# Patient Record
(demographics unavailable — no encounter records)

---

## 2018-02-11 NOTE — ER REPORT
History and Physical


Time Seen By MD:  10:04


HPI/ROS


CHIEF COMPLAINT: Right pinky pain





HISTORY OF PRESENT ILLNESS: 13 year-old male sustained injury while playing 

basketball yesterday a ball that was passed with moderate force, his pinky and 

bent it out to the side causing bruising and swelling. This occurred around 4 

PM yesterday. It was bandaged up overnight but did not Gro[place] this morning. 

He is unable to fully straighten the pinky or make a fist due to pain and 

swelling. Concern for fracture. No other concerns or complaints today.





REVIEW OF SYSTEMS:


Respiratory: No cough, no dyspnea.


Cardiovascular: No chest pain, no palpitations.


Gastrointestinal: No vomiting, no abdominal pain.


Musculoskeletal: No back pain.


Allergies:  


Coded Allergies:  


     No Known Drug Allergies (Unverified , 2/11/18)


Constitutional





Vital Sign - Last 24 Hours








 2/11/18





 10:03


 


Temp 98.9


 


Pulse 93


 


Resp 20


 


B/P (MAP) 114/59


 


Pulse Ox 92


 


O2 Delivery Room Air








Physical Exam


  General Appearance: The patient is alert, has no immediate need for airway 

protection and no current signs of toxicity. No acute distress


Eyes: Pupils equal and round no injection.


Respiratory: Chest is non tender, lungs are clear to auscultation.


Cardiac: regular rate and rhythm [ ]


Gastrointestinal: Abdomen is soft and non tender, no masses, bowel sounds 

normal.


Musculoskeletal:  Neck: Neck is supple and non tender.


  Right pinky deviates laterally moderate swelling and discoloration ecchymoses 

no signs of cellulitis or infection: Slightly bent position. Otherwise all 

Extremities have full range of motion and are non tender.


Skin: No rashes or lesions.


Intact no lac or abrasion


DIFFERENTIAL DIAGNOSIS: After history and physical exam differential diagnosis 

was considered for pinky fracture, sprain, dislocation no signs of infection or 

abscess





Medical Decision Making


EKG/Imaging


Imaging


X-ray positive for fracture right 5th pinky finger: Acute Merkley oriented 

proximal phalanx 2 mm medial displacement approximately does not extend into 

growth plate





ED Course/Re-evaluation


ED Course


Plan of care agreed upon prior to orders placed. X-rays right pinky to assess 

for fracture dislocation other abnormality. Had ibuprofen prior to arrival and 

does not need pain medication at this time 02/11/2018 10:17:11 am


Procedure


Finger splint applied by ED tech: I evaluated splint for adequacy and comfort 

patient seen to be neurovascularly intact


Decision to Disposition Date:  Feb 11, 2018


Decision to Disposition Time:  11:19





Depart


Departure


Latest Vital Signs





Vital Signs








  Date Time  Temp Pulse Resp B/P (MAP) Pulse Ox O2 Delivery O2 Flow Rate FiO2


 


2/11/18 10:03 98.9 93 20 114/59 92 Room Air  








Impression:  


 Primary Impression:  


 Closed fracture of distal phalanx of right little finger


Condition:  Improved


Disposition:  HOME OR SELF-CARE


Referrals:  


MARY TINOCO MD


2 Days


New Scripts


Acetaminophen With Codeine # 3 (TYLENOL WITH CODEINE #3 TABLET) 1 Each Tablet


1 EACH PO QHS Y for SEVERE PAIN for 7 Days, #10 TAB


   Prov: DARCIE MEDLEY MD         2/11/18





Problem Qualifiers








 Primary Impression:  


 Closed fracture of distal phalanx of right little finger


 Encounter type:  initial encounter  Fracture alignment:  displaced  Qualified 

Codes:  S62.636A - Displaced fracture of distal phalanx of right little finger, 

initial encounter for closed fracture








DARCIE MEDLEY MD Feb 11, 2018 10:04

## 2018-02-11 NOTE — RADIOLOGY IMAGING REPORT
FACILITY: [facility] [place] 

 

PATIENT NAME: Kei Kamara

: 2004

MR: [id number]

V: [id number]

EXAM DATE: 

ORDERING PHYSICIAN: DARCIE MEDLEY

TECHNOLOGIST: 

 

Location: [facility]

Patient: Kei Kamara

: 2004

MRN: [id number]

Visit/Account:[id number]

Date of Sevice:  2018

 

ACCESSION #: [id number]

 

EXAMINATION:

Right 5th finger radiographs 3 views

 

HISTORY:  Trauma from basketball. Evaluate for fracture.

 

COMPARISON: None.

 

FINDINGS: PA, lateral and oblique views of the right 5th finger are obtained.

 

Bones:  Patient is skeletally immature, normal for age. There is an acute, vertically oriented fractu
re of the proximal medial aspect of the 5th proximal phalanx metaphysis. The fracture does not defini
tely extend to the growth plate.

Joint spaces:  Negative.

Hardware: None.

Alignment:  2 mm medial displacement of the proximal aspect of the fracture.

Soft tissues:  Mild soft tissue swelling adjacent to the fracture.

 

IMPRESSION:

 

Acute, vertically oriented fracture of the right 5th proximal phalanx at the proximal medial aspect. 
2 mm medial displacement proximally.

 

Report Dictated By: Roselia Wiley MD at 2018 10:59 AM

 

Report E-Signed By: Roselia Wiley MD  at 2018 11:01 AM

 

WSN:[id number]

## 2018-11-07 NOTE — RADIOLOGY IMAGING REPORT
FACILITY: [facility] 

 

PATIENT NAME: Kei Kamara

: 2004

MR: [id number]

V: [id number]

EXAM DATE: 

ORDERING PHYSICIAN: ABHAY BALL

TECHNOLOGIST: 

 

Location: [facility]

Patient: Kei Kamara

: 2004

MRN: [id number]

Visit/Account:[id number]

Date of Sevice: 2018

 

ACCESSION #: [id number]

 

EXAMINATION:

MRI Brain without intravenous contrast

MRI Brain with intravenous contrast, detailed IACs

 

HISTORY:   Left-sided hearing loss.

 

COMPARISON:  Brain MRI dated 10/9/2018.

 

TECHNIQUE:  Multi-planar, multi-sequence brain MRI was performed before and after IV gadolinium. Thin
 section imaging was performed in the axial and coronal planes centered on the IACs.

 

CONTRAST: 13 mL of IV MultiHance

 

FINDINGS:

 

IAC detailed study:

Brain stem: Negative.

Cerebellopontine angles: 2.0 x 1.3 x 1.3 cm mass in the left cerebellopontine angle cistern and left 
internal auditory canal extending to the fundus of the IAC. The mass is intermediate signal on T1 and
 T2-weighted images with avid enhancement. No significant change compared with 10/9/2018.

IACs / CN VII and VIII: Otherwise negative.

Inner ear: Negative.

Middle ear: Negative.

Mastoids / petrous apices: Negative.

 

Rest of brain: Stable mild patchy FLAIR hyperintensity in the bilateral frontoparietal white matter. 
Decreased mucosal thickening and fluid in the paranasal sinuses compared with 2018. Stable cysts 
or polyps in the left maxillary sinus. Leftward nasal septal deviation.

 

IMPRESSION:

 

1. Stable 2.0 x 1.3 x 1.3 cm mass in the left cerebellopontine angle cistern and internal auditory ca
nal, most likely vestibular schwannoma.

 

2. Stable mild chronic nonspecific white matter disease in the frontal and parietal lobes.

 

3. Decreased mucosal thickening and fluid in the paranasal sinuses compared with 2018. Stable cys
ts or polyps in the left maxillary sinus. Leftward nasal septal deviation.

 

Report Dictated By: Flavio Cline MD at 2018 3:27 PM

 

Report E-Signed By: Flavio Cline MD  at 2018 3:38 PM

 

WSN:[id number]